# Patient Record
Sex: FEMALE | Employment: FULL TIME | URBAN - METROPOLITAN AREA
[De-identification: names, ages, dates, MRNs, and addresses within clinical notes are randomized per-mention and may not be internally consistent; named-entity substitution may affect disease eponyms.]

---

## 2019-07-30 PROBLEM — G35 MULTIPLE SCLEROSIS (HCC): Status: ACTIVE | Noted: 2018-07-09

## 2019-07-30 PROBLEM — G82.20 PARAPARESIS (HCC): Status: ACTIVE | Noted: 2018-07-09

## 2019-07-30 PROBLEM — E78.00 PURE HYPERCHOLESTEROLEMIA: Status: ACTIVE | Noted: 2018-10-15

## 2019-07-30 PROBLEM — R26.9 IMPAIRED GAIT: Status: ACTIVE | Noted: 2018-07-09

## 2019-07-31 PROCEDURE — 84425 ASSAY OF VITAMIN B-1: CPT | Performed by: OTHER

## 2019-07-31 PROCEDURE — 80074 ACUTE HEPATITIS PANEL: CPT | Performed by: OTHER

## 2019-07-31 PROCEDURE — 83921 ORGANIC ACID SINGLE QUANT: CPT | Performed by: OTHER

## 2020-02-28 ENCOUNTER — OFFICE VISIT (OUTPATIENT)
Dept: NEUROLOGY | Facility: CLINIC | Age: 55
End: 2020-02-28
Payer: COMMERCIAL

## 2020-02-28 VITALS
HEIGHT: 61.5 IN | DIASTOLIC BLOOD PRESSURE: 76 MMHG | SYSTOLIC BLOOD PRESSURE: 131 MMHG | HEART RATE: 70 BPM | BODY MASS INDEX: 29.08 KG/M2 | WEIGHT: 156 LBS | RESPIRATION RATE: 16 BRPM

## 2020-02-28 DIAGNOSIS — G35 MULTIPLE SCLEROSIS (HCC): Primary | ICD-10-CM

## 2020-02-28 PROCEDURE — 99204 OFFICE O/P NEW MOD 45 MIN: CPT | Performed by: OTHER

## 2020-02-28 NOTE — PROGRESS NOTES
[address] [facility] with [facility]  2/28/2020    8:48 AM      60 RHF who in [address] was diagnosed with MS after presenting with ON. Avonex was started in 2001.   She was on it for a while and then transferred he General: Present- Feeling well. Not Present- Fatigue, Weight Gain and Weight Loss. Denies systemic symptoms  Respiratory: Not Present- Difficulty Breathing, Chronic Cough and Wheezing. Cardiovascular: Not Present- Chest Pain and Palpitations.   Neurologic

## 2020-03-17 ENCOUNTER — TELEPHONE (OUTPATIENT)
Dept: NEUROLOGY | Facility: CLINIC | Age: 55
End: 2020-03-17

## 2020-03-18 ENCOUNTER — TELEPHONE (OUTPATIENT)
Dept: NEUROLOGY | Facility: CLINIC | Age: 55
End: 2020-03-18

## 2020-09-10 ENCOUNTER — OFFICE VISIT (OUTPATIENT)
Dept: NEUROLOGY | Facility: CLINIC | Age: 55
End: 2020-09-10
Payer: COMMERCIAL

## 2020-09-10 VITALS
DIASTOLIC BLOOD PRESSURE: 69 MMHG | TEMPERATURE: 98 F | WEIGHT: 156 LBS | RESPIRATION RATE: 16 BRPM | SYSTOLIC BLOOD PRESSURE: 138 MMHG | HEIGHT: 61.5 IN | HEART RATE: 73 BPM | BODY MASS INDEX: 29.08 KG/M2

## 2020-09-10 DIAGNOSIS — G35 MULTIPLE SCLEROSIS (HCC): Primary | ICD-10-CM

## 2020-09-10 PROCEDURE — 99214 OFFICE O/P EST MOD 30 MIN: CPT | Performed by: OTHER

## 2020-09-10 PROCEDURE — 3008F BODY MASS INDEX DOCD: CPT | Performed by: OTHER

## 2020-09-10 PROCEDURE — 3075F SYST BP GE 130 - 139MM HG: CPT | Performed by: OTHER

## 2020-09-10 PROCEDURE — 3078F DIAST BP <80 MM HG: CPT | Performed by: OTHER

## 2020-09-10 RX ORDER — PNV NO.95/FERROUS FUM/FOLIC AC 28MG-0.8MG
TABLET ORAL
COMMUNITY

## 2020-09-10 NOTE — PROGRESS NOTES
[facility] with [address]  2/2/1965  Primary Care Provider:  Salma Rocha MD    9/10/2020  Accompanied visit:      (x) No.        60year old yo patient being seen for potential side effects of any treatment relevant to above. Includes explanation of tests as necessary.     3 months    Patient understands that if needed, based on condition and or test results, follow up will be readjusted      Raymond Dickey

## 2020-09-22 ENCOUNTER — TELEPHONE (OUTPATIENT)
Dept: NEUROLOGY | Facility: CLINIC | Age: 55
End: 2020-09-22

## 2020-09-22 NOTE — TELEPHONE ENCOUNTER
RN discussed the side effects of Solumedrol with the patient and the process of scheduling the Solumedrol. Pt trying to decide if she wants to move forward with the Solumedrol Infusion. RN will start the PA.

## 2020-09-23 ENCOUNTER — TELEPHONE (OUTPATIENT)
Dept: NEUROLOGY | Facility: CLINIC | Age: 55
End: 2020-09-23

## 2020-09-23 DIAGNOSIS — G35 MULTIPLE SCLEROSIS (HCC): Primary | ICD-10-CM

## 2020-09-24 NOTE — TELEPHONE ENCOUNTER
Called insurance [facility] and spoke with Artem Owusu. She indicated that patient plan will term on 9/30/2020. For code  and 11866 Dx: G35 500mg  3 days. PA is needed through [address]. Call reference #[id number].  Time on call 7:21

## 2020-10-12 ENCOUNTER — OFFICE VISIT (OUTPATIENT)
Dept: NEUROLOGY | Facility: CLINIC | Age: 55
End: 2020-10-12
Payer: COMMERCIAL

## 2020-10-12 ENCOUNTER — TELEPHONE (OUTPATIENT)
Dept: NEUROLOGY | Facility: CLINIC | Age: 55
End: 2020-10-12

## 2020-10-12 VITALS
RESPIRATION RATE: 16 BRPM | TEMPERATURE: 97 F | SYSTOLIC BLOOD PRESSURE: 130 MMHG | BODY MASS INDEX: 29.08 KG/M2 | HEIGHT: 61.5 IN | WEIGHT: 156 LBS | HEART RATE: 88 BPM | DIASTOLIC BLOOD PRESSURE: 74 MMHG

## 2020-10-12 DIAGNOSIS — G35 MULTIPLE SCLEROSIS (HCC): Primary | ICD-10-CM

## 2020-10-12 PROCEDURE — 3078F DIAST BP <80 MM HG: CPT | Performed by: OTHER

## 2020-10-12 PROCEDURE — 99214 OFFICE O/P EST MOD 30 MIN: CPT | Performed by: OTHER

## 2020-10-12 PROCEDURE — 3075F SYST BP GE 130 - 139MM HG: CPT | Performed by: OTHER

## 2020-10-12 PROCEDURE — 3008F BODY MASS INDEX DOCD: CPT | Performed by: OTHER

## 2020-10-12 PROCEDURE — 96365 THER/PROPH/DIAG IV INF INIT: CPT | Performed by: OTHER

## 2020-10-12 RX ORDER — METHYLPREDNISOLONE SODIUM SUCCINATE 500 MG/1
500 INJECTION, POWDER, FOR SOLUTION INTRAMUSCULAR; INTRAVENOUS DAILY
Status: COMPLETED | OUTPATIENT
Start: 2020-10-12 | End: 2020-10-14

## 2020-10-12 RX ORDER — PREDNISONE 20 MG/1
TABLET ORAL
Qty: 30 TABLET | Refills: 0 | Status: SHIPPED | OUTPATIENT
Start: 2020-10-12 | End: 2021-02-18

## 2020-10-12 RX ADMIN — METHYLPREDNISOLONE SODIUM SUCCINATE 500 MG: 500 INJECTION, POWDER, FOR SOLUTION INTRAMUSCULAR; INTRAVENOUS at 14:31:00

## 2020-10-12 NOTE — TELEPHONE ENCOUNTER
Called [facility] [phone] and spoke with Berta ANTONIO She said this is approved until 9/30/20 but now the patient is effective until 10/30/20 so she extended the approval #[id number] until 10/30/20.  She can have as many treatments of Solumedrol J2

## 2020-10-12 NOTE — PROGRESS NOTES
IV started per Dr. Pastora Mobley and Solu-Medrol infused over 20 minutes without difficulty. IV discontinued per RN. Patient tolerated infusion well with no complications.     Patient will return to clinic daily for 2 additional infusions, followed by oral tape

## 2020-10-13 ENCOUNTER — NURSE ONLY (OUTPATIENT)
Dept: NEUROLOGY | Facility: CLINIC | Age: 55
End: 2020-10-13
Payer: COMMERCIAL

## 2020-10-13 VITALS
TEMPERATURE: 98 F | HEART RATE: 70 BPM | SYSTOLIC BLOOD PRESSURE: 130 MMHG | DIASTOLIC BLOOD PRESSURE: 76 MMHG | RESPIRATION RATE: 16 BRPM

## 2020-10-13 DIAGNOSIS — G35 MULTIPLE SCLEROSIS (HCC): ICD-10-CM

## 2020-10-13 PROCEDURE — 3078F DIAST BP <80 MM HG: CPT | Performed by: OTHER

## 2020-10-13 PROCEDURE — 96365 THER/PROPH/DIAG IV INF INIT: CPT | Performed by: OTHER

## 2020-10-13 PROCEDURE — 3075F SYST BP GE 130 - 139MM HG: CPT | Performed by: OTHER

## 2020-10-13 RX ADMIN — METHYLPREDNISOLONE SODIUM SUCCINATE 500 MG: 500 INJECTION, POWDER, FOR SOLUTION INTRAMUSCULAR; INTRAVENOUS at 08:53:00

## 2020-10-13 NOTE — PROGRESS NOTES
IV started per RN and Solu-Medrol infused over 20 minutes without difficulty. IV discontinued per RN. Patient tolerated infusion well with no complications.

## 2020-10-14 ENCOUNTER — NURSE ONLY (OUTPATIENT)
Dept: NEUROLOGY | Facility: CLINIC | Age: 55
End: 2020-10-14
Payer: COMMERCIAL

## 2020-10-14 VITALS
HEART RATE: 64 BPM | SYSTOLIC BLOOD PRESSURE: 120 MMHG | DIASTOLIC BLOOD PRESSURE: 72 MMHG | RESPIRATION RATE: 18 BRPM | TEMPERATURE: 98 F

## 2020-10-14 DIAGNOSIS — G35 MULTIPLE SCLEROSIS (HCC): ICD-10-CM

## 2020-10-14 PROCEDURE — 3078F DIAST BP <80 MM HG: CPT | Performed by: OTHER

## 2020-10-14 PROCEDURE — 96365 THER/PROPH/DIAG IV INF INIT: CPT | Performed by: OTHER

## 2020-10-14 PROCEDURE — 3074F SYST BP LT 130 MM HG: CPT | Performed by: OTHER

## 2020-10-14 RX ADMIN — METHYLPREDNISOLONE SODIUM SUCCINATE 500 MG: 500 INJECTION, POWDER, FOR SOLUTION INTRAMUSCULAR; INTRAVENOUS at 14:18:00

## 2020-10-14 NOTE — PROGRESS NOTES
IV started per Dr. Zina Torres after several unsuccessful attempts. IV started into right hand site and Solu-Medrol infused over 20 minutes without difficulty. IV discontinued per RN. Patient tolerated infusion well with no complications.     To begin oral ta

## 2021-03-05 ENCOUNTER — OFFICE VISIT (OUTPATIENT)
Dept: NEUROLOGY | Facility: CLINIC | Age: 56
End: 2021-03-05
Payer: COMMERCIAL

## 2021-03-05 VITALS
HEART RATE: 74 BPM | BODY MASS INDEX: 29.27 KG/M2 | RESPIRATION RATE: 16 BRPM | SYSTOLIC BLOOD PRESSURE: 122 MMHG | DIASTOLIC BLOOD PRESSURE: 70 MMHG | HEIGHT: 61 IN | WEIGHT: 155 LBS

## 2021-03-05 DIAGNOSIS — G82.20 PARAPARESIS (HCC): ICD-10-CM

## 2021-03-05 DIAGNOSIS — R26.9 IMPAIRED GAIT: ICD-10-CM

## 2021-03-05 DIAGNOSIS — G35 MULTIPLE SCLEROSIS (HCC): Primary | ICD-10-CM

## 2021-03-05 PROCEDURE — 99214 OFFICE O/P EST MOD 30 MIN: CPT | Performed by: OTHER

## 2021-03-05 PROCEDURE — 3008F BODY MASS INDEX DOCD: CPT | Performed by: OTHER

## 2021-03-05 PROCEDURE — 3074F SYST BP LT 130 MM HG: CPT | Performed by: OTHER

## 2021-03-05 PROCEDURE — 3078F DIAST BP <80 MM HG: CPT | Performed by: OTHER

## 2021-03-05 NOTE — PROGRESS NOTES
[facility] with [address]  2/2/1965  Primary Care Provider:  Madeline Agustin MD    3/5/2021  Accompanied visit:      (x) No.      60year old yo patient being seen for: gait      Discussion plus Diagnostics & Treatment Orders:  Continue Pierre Gardiner but will get COVID vaccine first before resuming  Discussed timing of shot 2 months after the Ocrevus infusion but most ideal is to do it now before next infusion (due in mid March

## 2021-03-15 ENCOUNTER — TELEPHONE (OUTPATIENT)
Dept: NEUROLOGY | Facility: CLINIC | Age: 56
End: 2021-03-15

## 2021-03-15 DIAGNOSIS — G35 MULTIPLE SCLEROSIS (HCC): Primary | ICD-10-CM

## 2021-03-15 NOTE — TELEPHONE ENCOUNTER
Spoke to patient, reiterated from [address] notes on 3/5/3032;    Continue [facility] but will get COVID vaccine first before resuming  Discussed timing of shot 2 months after the Ocrevus infusion but most ideal is to do it now before next infusion (due in mid March

## 2021-03-15 NOTE — TELEPHONE ENCOUNTER
[facility] pharmacy contacted patient regarding Ocrelizumab (OCREVUS IV). They state that they need a form filled out called \"prescriber service form\" in order to fill prescription.        Patient also is requesting advice regarding when to start ocrevus af

## 2021-03-17 NOTE — TELEPHONE ENCOUNTER
LMTCB Called patient to inquire where she would like to have labs (CBC, CMP) drawn and also to clarify what neurologist she will follow.

## 2021-03-18 NOTE — TELEPHONE ENCOUNTER
RN LM for patient informing her the labs were entered and ready whenever she is ready to go get them drawn.

## 2021-03-18 NOTE — TELEPHONE ENCOUNTER
Spoke to patient and she will continue with dr Brian Oakley.  Patient will get labs on [facility]

## 2021-03-22 ENCOUNTER — TELEPHONE (OUTPATIENT)
Dept: NEUROLOGY | Facility: CLINIC | Age: 56
End: 2021-03-22

## 2021-03-22 NOTE — TELEPHONE ENCOUNTER
CB/Diff placed on the nurses bin. DOS 03/19/2021  Report placed on the nurses bin  Copy sent to scan.

## 2021-03-25 ENCOUNTER — TELEPHONE (OUTPATIENT)
Dept: NEUROLOGY | Facility: CLINIC | Age: 56
End: 2021-03-25

## 2021-03-25 DIAGNOSIS — G35 MULTIPLE SCLEROSIS (HCC): Primary | ICD-10-CM

## 2021-03-25 NOTE — TELEPHONE ENCOUNTER
PA initiated via CMM and SAVED, key: [id number]    Need to clarify (not documented in Epic) that patient wishes to use [facility], and which location. Also requesting review by PA/referrals team as this infusion may require pre-determination.

## 2021-04-22 NOTE — TELEPHONE ENCOUNTER
Prior authorization has been obtained from [facility]. Authorized to receive treatment at [facility] effective 4/10/2021 - 5/25/2021    Additional approval received for treatment 5/25/2021 - 4/10/2022.     Called and informed patient that Sabra Coronado has been approved an

## 2021-04-27 ENCOUNTER — TELEPHONE (OUTPATIENT)
Dept: NEUROLOGY | Facility: CLINIC | Age: 56
End: 2021-04-27

## 2021-04-27 DIAGNOSIS — G35 MULTIPLE SCLEROSIS (HCC): Primary | ICD-10-CM

## 2021-04-27 NOTE — TELEPHONE ENCOUNTER
Patient received her second covid vaccine on 4/21/2021. Would like to wait 4 weeks to infuse her next dose of Ocrevus. Will need orders signed and sent to [facility]. Contact number to call: [phone]. Will call for order set.

## 2021-05-03 NOTE — TELEPHONE ENCOUNTER
Received call from patient. She is going to be infusing at [facility] infusion center in [facility]. Requesting the order/referral for the infusion to be sent by fax to [phone] and the pharmacists will process. Please call patient when fax is confirmed.

## 2021-05-04 RX ORDER — OCRELIZUMAB 300 MG/10ML
600 INJECTION INTRAVENOUS
Qty: 20 ML | Refills: 1 | Status: SHIPPED
Start: 2021-05-04 | End: 2021-05-04

## 2021-05-04 RX ORDER — OCRELIZUMAB 300 MG/10ML
600 INJECTION INTRAVENOUS
Qty: 20 ML | Refills: 1 | Status: SHIPPED
Start: 2021-05-04

## 2021-05-04 NOTE — TELEPHONE ENCOUNTER
Referral ordered updated in Epic for Ocrevus infusions. Orders to be faxed again to [facility].

## 2021-05-04 NOTE — TELEPHONE ENCOUNTER
Patient requested additional information be sent to [facility] so she will be able to schedule her next infusion.

## 2021-05-04 NOTE — TELEPHONE ENCOUNTER
Orders faxed to [facility] infusion services at [phone] and [phone]. Receipt confirmation received. Premedication orders added to infusion orders. Patient notified of orders sent as requested. She will call and schedule her infusion.

## 2021-09-12 PROBLEM — R73.09 ELEVATED HEMOGLOBIN A1C: Status: ACTIVE | Noted: 2021-09-12

## 2021-11-29 ENCOUNTER — PATIENT MESSAGE (OUTPATIENT)
Dept: NEUROLOGY | Facility: CLINIC | Age: 56
End: 2021-11-29

## 2021-11-29 DIAGNOSIS — G35 MULTIPLE SCLEROSIS (HCC): Primary | ICD-10-CM

## 2021-11-30 NOTE — TELEPHONE ENCOUNTER
From: [facility]  To:  Betty Burt MD  Sent: 11/29/2021 7:28 PM CST  Subject: MRI inquiry before end of yr    Dr. Sunil Elder    I have met my annual health care deductible; I my MS protocol includes updated MRI scans, I am requesting your op

## 2021-12-03 ENCOUNTER — TELEPHONE (OUTPATIENT)
Dept: NEUROLOGY | Facility: CLINIC | Age: 56
End: 2021-12-03

## 2021-12-03 NOTE — TELEPHONE ENCOUNTER
Patient called, states she did not realized she needed to schedule OCREVUS infusion. Last infusion 5/26 @ [facility]. Patient informed that she will be contacted in next few days regarding plan for next infusion.   States she has had three COVID-19 vaccinati

## 2022-01-04 NOTE — TELEPHONE ENCOUNTER
Per Epic review, patient with authorization on file. Orders were faxed to infusion center for infusion in May/June and should be good for one additional infusion. Left message for patient to call the infusion center to set up infusion.  Office does no

## 2022-02-18 ENCOUNTER — TELEPHONE (OUTPATIENT)
Dept: NEUROLOGY | Facility: CLINIC | Age: 57
End: 2022-02-18

## 2022-02-18 NOTE — TELEPHONE ENCOUNTER
New PA started it approved for [facility] in [facility].  See referral. I call the patient and left her a message

## 2022-02-18 NOTE — TELEPHONE ENCOUNTER
Patient calling, advised that she did not get imaging ordered by Dr. Carito Soriano on 22. Notes that she would like to get MRI done with [facility] at [address]. I noted that her last authorization has  as of 22, and will route to PA team in order to get imaging re-approved for new location. Please advise.

## 2022-03-10 ENCOUNTER — OFFICE VISIT (OUTPATIENT)
Dept: NEUROLOGY | Facility: CLINIC | Age: 57
End: 2022-03-10
Payer: COMMERCIAL

## 2022-03-10 VITALS
DIASTOLIC BLOOD PRESSURE: 66 MMHG | SYSTOLIC BLOOD PRESSURE: 140 MMHG | BODY MASS INDEX: 29.41 KG/M2 | HEIGHT: 63 IN | WEIGHT: 166 LBS | RESPIRATION RATE: 16 BRPM | HEART RATE: 78 BPM

## 2022-03-10 DIAGNOSIS — G35 MULTIPLE SCLEROSIS (HCC): Primary | ICD-10-CM

## 2022-03-10 PROCEDURE — 99213 OFFICE O/P EST LOW 20 MIN: CPT | Performed by: PHYSICIAN ASSISTANT

## 2022-03-10 PROCEDURE — 3078F DIAST BP <80 MM HG: CPT | Performed by: PHYSICIAN ASSISTANT

## 2022-03-10 PROCEDURE — 3008F BODY MASS INDEX DOCD: CPT | Performed by: PHYSICIAN ASSISTANT

## 2022-03-10 PROCEDURE — 3077F SYST BP >= 140 MM HG: CPT | Performed by: PHYSICIAN ASSISTANT

## 2022-07-26 ENCOUNTER — TELEPHONE (OUTPATIENT)
Dept: NEUROLOGY | Facility: CLINIC | Age: 57
End: 2022-07-26

## 2022-07-26 NOTE — TELEPHONE ENCOUNTER
Dr. Asha Rangel, neurologist, from [facility] needs referral in order for the patient to be scheduled.   Placed request in nurse tray

## 2022-07-26 NOTE — TELEPHONE ENCOUNTER
RN called the patient and explained we do not give referrals to other Neurologists. Patient verbalized understanding and did not have any further questions.

## 2022-11-10 NOTE — PROGRESS NOTES
[facility] with [address]  2/2/1965  Primary Care Provider:  Patricia Stevens MD    10/12/2020  Accompanied visit:      (x) No.        60year old yo patient being seen fo probing and see how much improvement she gets. Then we reconsider that DMT we can get to use. (x) Discussed potential side effects of any treatment relevant to above. Includes explanation of tests as necessary.     RTC in 6 weeks to reevaluate    Subha Astudillo negative